# Patient Record
Sex: FEMALE | Race: WHITE | NOT HISPANIC OR LATINO | Employment: OTHER | URBAN - METROPOLITAN AREA
[De-identification: names, ages, dates, MRNs, and addresses within clinical notes are randomized per-mention and may not be internally consistent; named-entity substitution may affect disease eponyms.]

---

## 2023-11-27 ENCOUNTER — OFFICE VISIT (OUTPATIENT)
Dept: INTERNAL MEDICINE | Facility: CLINIC | Age: 68
End: 2023-11-27
Payer: COMMERCIAL

## 2023-11-27 VITALS
HEART RATE: 102 BPM | BODY MASS INDEX: 29.06 KG/M2 | SYSTOLIC BLOOD PRESSURE: 140 MMHG | WEIGHT: 164 LBS | HEIGHT: 63 IN | DIASTOLIC BLOOD PRESSURE: 84 MMHG | OXYGEN SATURATION: 98 %

## 2023-11-27 DIAGNOSIS — L30.9 ECZEMA, UNSPECIFIED TYPE: ICD-10-CM

## 2023-11-27 DIAGNOSIS — I10 PRIMARY HYPERTENSION: Primary | Chronic | ICD-10-CM

## 2023-11-27 DIAGNOSIS — Z00.00 ENCOUNTER FOR MEDICAL EXAMINATION TO ESTABLISH CARE: ICD-10-CM

## 2023-11-27 PROBLEM — J36 PERITONSILLAR ABSCESS: Status: ACTIVE | Noted: 2021-10-06

## 2023-11-27 RX ORDER — TRIAMCINOLONE ACETONIDE 0.25 MG/G
1 OINTMENT TOPICAL 2 TIMES DAILY
Qty: 80 G | Refills: 1 | Status: SHIPPED | OUTPATIENT
Start: 2023-11-27

## 2023-11-27 RX ORDER — LISINOPRIL 10 MG/1
10 TABLET ORAL DAILY
Qty: 90 TABLET | Refills: 1 | Status: SHIPPED | OUTPATIENT
Start: 2023-11-27

## 2023-11-27 RX ORDER — AMLODIPINE BESYLATE 5 MG/1
TABLET ORAL
COMMUNITY
Start: 2023-10-14 | End: 2023-11-27 | Stop reason: SDUPTHER

## 2023-11-27 RX ORDER — CARVEDILOL 3.12 MG/1
3.12 TABLET ORAL EVERY 12 HOURS SCHEDULED
Qty: 180 TABLET | Refills: 1 | Status: SHIPPED | OUTPATIENT
Start: 2023-11-27

## 2023-11-27 RX ORDER — AMLODIPINE BESYLATE 5 MG/1
5 TABLET ORAL DAILY
Qty: 90 TABLET | Refills: 1 | Status: SHIPPED | OUTPATIENT
Start: 2023-11-27

## 2023-11-27 RX ORDER — LISINOPRIL 10 MG/1
10 TABLET ORAL DAILY
COMMUNITY
Start: 2023-08-22 | End: 2023-11-27 | Stop reason: SDUPTHER

## 2023-11-27 RX ORDER — CARVEDILOL 3.12 MG/1
3.12 TABLET ORAL EVERY 12 HOURS SCHEDULED
COMMUNITY
Start: 2023-10-22 | End: 2023-11-27 | Stop reason: SDUPTHER
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
## 2023-11-27 NOTE — PATIENT INSTRUCTIONS
Refills sent to [facility]. Apply triamcinolone as prescribed. Stay active. Stay hydrated with water. Monitor blood pressure at home. Record and bring to next appointment. Follow-up in 3 months for annual exam and fasting labs.

## 2023-12-15 ENCOUNTER — TELEPHONE (OUTPATIENT)
Dept: INTERNAL MEDICINE | Facility: CLINIC | Age: 68
End: 2023-12-15
Payer: COMMERCIAL

## 2023-12-15 DIAGNOSIS — I10 PRIMARY HYPERTENSION: Chronic | ICD-10-CM

## 2023-12-15 RX ORDER — LISINOPRIL 10 MG/1
10 TABLET ORAL DAILY
Qty: 90 TABLET | Refills: 1 | Status: CANCELLED | OUTPATIENT
Start: 2023-12-15

## 2023-12-15 NOTE — TELEPHONE ENCOUNTER
Caller: Jonelle Anna    Relationship: Self    Best call back number: [phone]     Requested Prescriptions:   Requested Prescriptions     Pending Prescriptions Disp Refills    lisinopril (PRINIVIL,ZESTRIL) 10 MG tablet 90 tablet 1     Sig: Take 1 tablet by mouth Daily. Indications: High Blood Pressure Disorder        Pharmacy where request should be sent: [facility] [id number] [phone]  - [phone]     Last office visit with prescribing clinician: 11/27/2023   Last telemedicine visit with prescribing clinician: Visit date not found   Next office visit with prescribing clinician: 2/27/2024     Additional details provided by patient: STATES MEDICATION IS SUPPOSE TO BE 20 MG NOT 10 MG     Does the patient have less than a 3 day supply:  [] Yes  [x] No    Would you like a call back once the refill request has been completed: [] Yes [x] No    If the office needs to give you a call back, can they leave a voicemail: [] Yes [x] No    Sendy Joyner Rep   12/15/23 15:16 EST

## 2023-12-18 RX ORDER — LISINOPRIL 20 MG/1
20 TABLET ORAL DAILY
Qty: 90 TABLET | Refills: 1 | Status: SHIPPED | OUTPATIENT
Start: 2023-12-18

## 2023-12-18 NOTE — TELEPHONE ENCOUNTER
Hub Relay  Called pt and LMOM that [facility] has sent in the correct mg of the prescription.  Please let pt know if she calls back. Hub please inform patient

## 2024-05-27 DIAGNOSIS — I10 PRIMARY HYPERTENSION: Chronic | ICD-10-CM

## 2024-05-28 NOTE — TELEPHONE ENCOUNTER
Rx Refill Note  Requested Prescriptions     Pending Prescriptions Disp Refills    lisinopril (PRINIVIL,ZESTRIL) 20 MG tablet [Pharmacy Med Name: Lisinopril Oral Tablet 20 MG] 90 tablet 0     Sig: TAKE 1 TABLET BY MOUTH ONE TIME DAILY      Last office visit with prescribing clinician: 11/27/2023   Last telemedicine visit with prescribing clinician: Visit date not found   Next office visit with prescribing clinician: 6/26/2024                         Would you like a call back once the refill request has been completed: [] Yes [] No    If the office needs to give you a call back, can they leave a voicemail: [] Yes [] No    Lisandra Fallon  05/28/24, 13:21 EDT

## 2024-05-29 RX ORDER — LISINOPRIL 20 MG/1
TABLET ORAL
Qty: 90 TABLET | Refills: 0 | Status: SHIPPED | OUTPATIENT
Start: 2024-05-29

## 2024-06-03 DIAGNOSIS — I10 PRIMARY HYPERTENSION: Chronic | ICD-10-CM

## 2024-06-03 RX ORDER — AMLODIPINE BESYLATE 5 MG/1
TABLET ORAL
Qty: 90 TABLET | Refills: 0 | Status: SHIPPED | OUTPATIENT
Start: 2024-06-03

## 2024-06-03 RX ORDER — CARVEDILOL 3.12 MG/1
3.12 TABLET ORAL EVERY 12 HOURS
Qty: 180 TABLET | Refills: 0 | Status: SHIPPED | OUTPATIENT
Start: 2024-06-03

## 2024-06-03 NOTE — TELEPHONE ENCOUNTER
Rx Refill Note  Requested Prescriptions     Pending Prescriptions Disp Refills    carvedilol (COREG) 3.125 MG tablet [Pharmacy Med Name: Carvedilol Oral Tablet 3.125 MG] 180 tablet 0     Sig: TAKE ONE TABLET BY MOUTH EVERY TWELVE HOURS    amLODIPine (NORVASC) 5 MG tablet [Pharmacy Med Name: amLODIPine Besylate Oral Tablet 5 MG] 90 tablet 0     Sig: TAKE 1 TABLET BY MOUTH ONE TIME DAILY      Last office visit with prescribing clinician: 11/27/2023   Last telemedicine visit with prescribing clinician: Visit date not found   Next office visit with prescribing clinician: 6/26/2024

## 2024-06-26 ENCOUNTER — OFFICE VISIT (OUTPATIENT)
Dept: INTERNAL MEDICINE | Facility: CLINIC | Age: 69
End: 2024-06-26
Payer: COMMERCIAL

## 2024-06-26 ENCOUNTER — HOSPITAL ENCOUNTER (OUTPATIENT)
Facility: HOSPITAL | Age: 69
Discharge: HOME | End: 2024-06-26
Payer: COMMERCIAL

## 2024-06-26 VITALS
DIASTOLIC BLOOD PRESSURE: 100 MMHG | BODY MASS INDEX: 29.16 KG/M2 | HEART RATE: 98 BPM | OXYGEN SATURATION: 98 % | HEIGHT: 63 IN | WEIGHT: 164.6 LBS | TEMPERATURE: 98.3 F | SYSTOLIC BLOOD PRESSURE: 180 MMHG

## 2024-06-26 DIAGNOSIS — I10 PRIMARY HYPERTENSION: Primary | Chronic | ICD-10-CM

## 2024-06-26 DIAGNOSIS — Z11.59 ENCOUNTER FOR HEPATITIS C SCREENING TEST FOR LOW RISK PATIENT: ICD-10-CM

## 2024-06-26 DIAGNOSIS — G89.29 CHRONIC PAIN OF LEFT ANKLE: ICD-10-CM

## 2024-06-26 DIAGNOSIS — M25.572 CHRONIC PAIN OF LEFT ANKLE: ICD-10-CM

## 2024-06-26 DIAGNOSIS — Z00.00 ANNUAL PHYSICAL EXAM: ICD-10-CM

## 2024-06-26 PROBLEM — R51.9 HEADACHE: Status: ACTIVE | Noted: 2024-06-26

## 2024-06-26 PROCEDURE — 99397 PER PM REEVAL EST PAT 65+ YR: CPT

## 2024-06-26 PROCEDURE — 99214 OFFICE O/P EST MOD 30 MIN: CPT

## 2024-06-26 PROCEDURE — 73610 X-RAY EXAM OF ANKLE: CPT

## 2024-06-26 RX ORDER — HYDRALAZINE HYDROCHLORIDE 25 MG/1
25 TABLET, FILM COATED ORAL DAILY PRN
Qty: 30 TABLET | Refills: 0 | Status: SHIPPED | OUTPATIENT
Start: 2024-06-26

## 2024-06-26 NOTE — PROGRESS NOTES
"Chief Complaint  Annual Exam    Subjective        Jonelle Anna presents to [facility]  History of Present Illness  69-year-old female presenting with hypertension and annual exam.  Took her blood pressure medication at 9:30 this morning.  Blood pressure significantly elevated during office visit.  Patient states at home blood pressure running 130/85.  Has been getting headaches and treating with acetaminophen.  Acetaminophen resolves headache.  Has been taking amlodipine 5 mg and lisinopril 20 mg daily, carvedilol 3.125 mg twice daily.    Concerned about her eyelids drooping.  Has been getting worse over the past couple years.  States that her eyes get frequently tired when driving.  Is unable to open eyes fully.  Denies changes in vision.    Has left ankle pain after walking on hard surfaces for extended periods of time.  Improves with rest.  Denies swelling, trauma or injury.  Range of motion is normal.      Objective   Vital Signs:  /100 Comment: BP meds taken at 9:30  Pulse 98   Temp 98.3 °F (36.8 °C)   Ht 160 cm (63\")   Wt 74.7 kg (164 lb 9.6 oz)   SpO2 98%   BMI 29.16 kg/m²   Estimated body mass index is 29.16 kg/m² as calculated from the following:    Height as of this encounter: 160 cm (63\").    Weight as of this encounter: 74.7 kg (164 lb 9.6 oz).               Physical Exam  Vitals reviewed.   Constitutional:       Appearance: Normal appearance.   HENT:      Head: Normocephalic.      Right Ear: Tympanic membrane normal.      Left Ear: Tympanic membrane normal.      Nose: Nose normal.      Mouth/Throat:      Mouth: Mucous membranes are moist.      Pharynx: Oropharynx is clear.   Eyes:      Pupils: Pupils are equal, round, and reactive to light.     Cardiovascular:      Rate and Rhythm: Normal rate.      Pulses: Normal pulses.      Heart sounds: Normal heart sounds.   Pulmonary:      Effort: Pulmonary effort is normal.      Breath sounds: Normal breath sounds. "   Abdominal:      General: Bowel sounds are normal.      Palpations: Abdomen is soft.   Musculoskeletal:         General: Normal range of motion.      Cervical back: Normal range of motion.   Skin:     General: Skin is warm and dry.      Capillary Refill: Capillary refill takes less than 2 seconds.   Neurological:      General: No focal deficit present.      Mental Status: She is alert and oriented to person, place, and time.   Psychiatric:         Mood and Affect: Mood normal.         Behavior: Behavior normal.         Thought Content: Thought content normal.         Judgment: Judgment normal.        Result Review :            Current Outpatient Medications on File Prior to Visit   Medication Sig Dispense Refill    amLODIPine (NORVASC) 5 MG tablet TAKE 1 TABLET BY MOUTH ONE TIME DAILY 90 tablet 0    carvedilol (COREG) 3.125 MG tablet TAKE ONE TABLET BY MOUTH EVERY TWELVE HOURS 180 tablet 0    lisinopril (PRINIVIL,ZESTRIL) 20 MG tablet TAKE 1 TABLET BY MOUTH ONE TIME DAILY 90 tablet 0    triamcinolone (KENALOG) 0.025 % ointment Apply 1 application  topically to the appropriate area as directed 2 (Two) Times a Day. 80 g 1     No current facility-administered medications on file prior to visit.                Assessment and Plan     Diagnoses and all orders for this visit:    1. Primary hypertension (Primary)  -     hydrALAZINE (APRESOLINE) 25 MG tablet; Take 1 tablet by mouth Daily As Needed (elevated blood pressure). Take if systolic 170 or higher or diastolic 110 or higher.  Dispense: 30 tablet; Refill: 0    2. Encounter for hepatitis C screening test for low risk patient  -     Hepatitis C Antibody; Future    3. Annual physical exam  -     Comprehensive Metabolic Panel; Future  -     Urinalysis With Microscopic If Indicated (No Culture) - Urine, Clean Catch; Future  -     TSH Rfx On Abnormal To Free T4; Future  -     Lipid Panel With / Chol / HDL Ratio; Future  -     CBC & Differential; Future    4. Chronic pain  of left ankle  -     XR Ankle 3+ View Left        Patient Instructions   Continue medications as prescribed. Monitor blood pressure at home. Record and bring to next appointment. Take one hydralazine daily if needed for blood pressure systolic 170 or higher or diastolic 110 or higher. X-ray ankle today. Labs in the future. Follow-up in 4 weeks for recheck.     The patient was counseled regarding nutrition, physical activity, healthy weight, injury prevention, misuse of tobacco, alcohol and illicit drugs, mental health, immunizations, and screenings.           Follow Up     Return in about 4 weeks (around 7/24/2024) for Recheck.  Patient was given instructions and counseling regarding her condition or for health maintenance advice. Please see specific information pulled into the AVS if appropriate.

## 2024-06-26 NOTE — PATIENT INSTRUCTIONS
Continue medications as prescribed. Monitor blood pressure at home. Record and bring to next appointment. Take one hydralazine daily if needed for blood pressure systolic 170 or higher or diastolic 110 or higher. X-ray ankle today. Labs in the future. Follow-up in 4 weeks for recheck.

## 2024-06-27 NOTE — PROGRESS NOTES
Small heel spur present.  Otherwise ankle is well-preserved with no signs arthritis.  Wear well supported and padded shoes to help alleviate discomfort when walking.

## 2024-06-28 ENCOUNTER — TELEPHONE (OUTPATIENT)
Dept: INTERNAL MEDICINE | Facility: CLINIC | Age: 69
End: 2024-06-28
Payer: COMMERCIAL

## 2024-06-28 NOTE — PROGRESS NOTES
Patient advised. Pt asked how to get rid of it and I advised cold/ice packs, are recommended along with wearing well padded shoes.    Patient states she will follow up with [facility] at her 7/26 appointment

## 2024-06-28 NOTE — TELEPHONE ENCOUNTER
Attempted to call patient regarding x-ray results, [facility].     Hub okay to relay:   Small heel spur present.  Otherwise ankle is well-preserved with no signs arthritis.  Wear well supported and padded shoes to help alleviate discomfort when walking.

## 2024-07-30 ENCOUNTER — TELEPHONE (OUTPATIENT)
Dept: INTERNAL MEDICINE | Facility: CLINIC | Age: 69
End: 2024-07-30
Payer: COMMERCIAL

## 2024-07-30 NOTE — TELEPHONE ENCOUNTER
Called patient regarding overdue lab orders, [facility].     Hub okay to relay:   You have overdue orders for labs in your chart. Please let us know if you plan on completing these or if we need to cancel them.

## 2024-09-12 ENCOUNTER — TELEPHONE (OUTPATIENT)
Dept: INTERNAL MEDICINE | Facility: CLINIC | Age: 69
End: 2024-09-12
Payer: COMMERCIAL

## 2024-09-12 NOTE — TELEPHONE ENCOUNTER
Patient needs to schedule a follow-up in order to have them reordered. Nothing is currently scheduled in the future.

## 2024-09-12 NOTE — TELEPHONE ENCOUNTER
The labs you ordered from  have  and the patient has not called to schedule a lab appointment. Do you want to reorder them?

## 2024-09-13 NOTE — TELEPHONE ENCOUNTER
Patient was already previously contacted about about these labs and has not reached back out. Will wait to hear from patient